# Patient Record
Sex: FEMALE | Race: WHITE | URBAN - METROPOLITAN AREA
[De-identification: names, ages, dates, MRNs, and addresses within clinical notes are randomized per-mention and may not be internally consistent; named-entity substitution may affect disease eponyms.]

---

## 2018-01-13 ENCOUNTER — HOSPITAL ENCOUNTER (EMERGENCY)
Facility: MEDICAL CENTER | Age: 28
End: 2018-01-13

## 2018-01-13 VITALS
DIASTOLIC BLOOD PRESSURE: 81 MMHG | OXYGEN SATURATION: 98 % | HEIGHT: 62 IN | WEIGHT: 145.5 LBS | TEMPERATURE: 97.8 F | SYSTOLIC BLOOD PRESSURE: 138 MMHG | BODY MASS INDEX: 26.78 KG/M2 | RESPIRATION RATE: 20 BRPM | HEART RATE: 138 BPM

## 2018-01-13 LAB — EKG IMPRESSION: NORMAL

## 2018-01-13 PROCEDURE — 93005 ELECTROCARDIOGRAM TRACING: CPT

## 2018-01-13 PROCEDURE — 302449 STATCHG TRIAGE ONLY (STATISTIC)

## 2018-01-13 ASSESSMENT — PAIN SCALES - GENERAL: PAINLEVEL_OUTOF10: 0

## 2018-01-14 NOTE — ED NOTES
Pt presents with a complaint of drug ingestion.  She states eating a marijuana brownie at approximately 2000 tonight.  Sh C/O palpitations.